# Patient Record
Sex: FEMALE | Race: BLACK OR AFRICAN AMERICAN
[De-identification: names, ages, dates, MRNs, and addresses within clinical notes are randomized per-mention and may not be internally consistent; named-entity substitution may affect disease eponyms.]

---

## 2015-07-25 VITALS
DIASTOLIC BLOOD PRESSURE: 85 MMHG | DIASTOLIC BLOOD PRESSURE: 85 MMHG | SYSTOLIC BLOOD PRESSURE: 159 MMHG | SYSTOLIC BLOOD PRESSURE: 159 MMHG | DIASTOLIC BLOOD PRESSURE: 85 MMHG | DIASTOLIC BLOOD PRESSURE: 85 MMHG | SYSTOLIC BLOOD PRESSURE: 159 MMHG | SYSTOLIC BLOOD PRESSURE: 159 MMHG

## 2021-02-08 ENCOUNTER — HOSPITAL ENCOUNTER (OUTPATIENT)
Dept: HOSPITAL 63 - LAB | Age: 56
End: 2021-02-08
Attending: FAMILY MEDICINE
Payer: COMMERCIAL

## 2021-02-08 DIAGNOSIS — R35.1: ICD-10-CM

## 2021-02-08 DIAGNOSIS — R19.01: ICD-10-CM

## 2021-02-08 DIAGNOSIS — E03.9: Primary | ICD-10-CM

## 2021-02-08 DIAGNOSIS — R09.02: ICD-10-CM

## 2021-02-08 LAB
ALBUMIN SERPL-MCNC: 3.6 G/DL (ref 3.4–5)
ALBUMIN/GLOB SERPL: 0.9 {RATIO} (ref 1–1.7)
ALP SERPL-CCNC: 124 U/L (ref 46–116)
ALT SERPL-CCNC: 25 U/L (ref 14–59)
ANION GAP SERPL CALC-SCNC: 6 MMOL/L (ref 6–14)
APTT PPP: YELLOW S
AST SERPL-CCNC: 18 U/L (ref 15–37)
BACTERIA #/AREA URNS HPF: (no result) /HPF
BASOPHILS # BLD AUTO: 0 X10^3/UL (ref 0–0.2)
BASOPHILS NFR BLD: 0 % (ref 0–3)
BILIRUB SERPL-MCNC: 0.2 MG/DL (ref 0.2–1)
BILIRUB UR QL STRIP: (no result)
BUN/CREAT SERPL: 16 (ref 6–20)
CA-I SERPL ISE-MCNC: 18 MG/DL (ref 7–20)
CALCIUM SERPL-MCNC: 9.2 MG/DL (ref 8.5–10.1)
CHLORIDE SERPL-SCNC: 104 MMOL/L (ref 98–107)
CO2 SERPL-SCNC: 32 MMOL/L (ref 21–32)
CREAT SERPL-MCNC: 1.1 MG/DL (ref 0.6–1)
EOSINOPHIL NFR BLD: 0.3 X10^3/UL (ref 0–0.7)
EOSINOPHIL NFR BLD: 3 % (ref 0–3)
ERYTHROCYTE [DISTWIDTH] IN BLOOD BY AUTOMATED COUNT: 15.9 % (ref 11.5–14.5)
FIBRINOGEN PPP-MCNC: (no result) MG/DL
GFR SERPLBLD BASED ON 1.73 SQ M-ARVRAT: 62.4 ML/MIN
GLOBULIN SER-MCNC: 4 G/DL (ref 2.2–3.8)
GLUCOSE SERPL-MCNC: 98 MG/DL (ref 70–99)
GLUCOSE UR STRIP-MCNC: (no result) MG/DL
HCT VFR BLD CALC: 36.7 % (ref 36–47)
HGB BLD-MCNC: 12 G/DL (ref 12–15.5)
LIPASE: 58 U/L (ref 73–393)
LYMPHOCYTES # BLD: 2.2 X10^3/UL (ref 1–4.8)
LYMPHOCYTES NFR BLD AUTO: 27 % (ref 24–48)
MCH RBC QN AUTO: 29 PG (ref 25–35)
MCHC RBC AUTO-ENTMCNC: 33 G/DL (ref 31–37)
MCV RBC AUTO: 89 FL (ref 79–100)
MONO #: 0.7 X10^3/UL (ref 0–1.1)
MONOCYTES NFR BLD: 8 % (ref 0–9)
NEUT #: 5.2 X10^3UL (ref 1.8–7.7)
NEUTROPHILS NFR BLD AUTO: 62 % (ref 31–73)
NITRITE UR QL STRIP: (no result)
PLATELET # BLD AUTO: 253 X10^3/UL (ref 140–400)
POTASSIUM SERPL-SCNC: 4 MMOL/L (ref 3.5–5.1)
PROT SERPL-MCNC: 7.6 G/DL (ref 6.4–8.2)
RBC # BLD AUTO: 4.12 X10^6/UL (ref 3.5–5.4)
RBC #/AREA URNS HPF: (no result) /HPF (ref 0–2)
SODIUM SERPL-SCNC: 142 MMOL/L (ref 136–145)
SP GR UR STRIP: 1.02
SQUAMOUS #/AREA URNS LPF: (no result) /LPF
UROBILINOGEN UR-MCNC: 0.2 MG/DL
WBC # BLD AUTO: 8.4 X10^3/UL (ref 4–11)
WBC #/AREA URNS HPF: (no result) /HPF (ref 0–4)

## 2021-02-08 PROCEDURE — 85025 COMPLETE CBC W/AUTO DIFF WBC: CPT

## 2021-02-08 PROCEDURE — 81001 URINALYSIS AUTO W/SCOPE: CPT

## 2021-02-08 PROCEDURE — 84439 ASSAY OF FREE THYROXINE: CPT

## 2021-02-08 PROCEDURE — 80053 COMPREHEN METABOLIC PANEL: CPT

## 2021-02-08 PROCEDURE — 84443 ASSAY THYROID STIM HORMONE: CPT

## 2021-02-08 PROCEDURE — 83690 ASSAY OF LIPASE: CPT

## 2021-02-08 PROCEDURE — 85379 FIBRIN DEGRADATION QUANT: CPT

## 2021-02-08 PROCEDURE — 36415 COLL VENOUS BLD VENIPUNCTURE: CPT

## 2021-02-08 PROCEDURE — 87086 URINE CULTURE/COLONY COUNT: CPT

## 2021-02-09 LAB
T4 FREE SERPL-MCNC: 0.92 NG/DL (ref 0.76–1.46)
THYROID STIM HORMONE (TSH): 0.79 UIU/ML (ref 0.36–3.74)

## 2021-02-24 ENCOUNTER — HOSPITAL ENCOUNTER (OUTPATIENT)
Dept: HOSPITAL 63 - CT | Age: 56
End: 2021-02-24
Attending: FAMILY MEDICINE
Payer: COMMERCIAL

## 2021-02-24 DIAGNOSIS — Z98.890: ICD-10-CM

## 2021-02-24 DIAGNOSIS — R19.01: ICD-10-CM

## 2021-02-24 DIAGNOSIS — K57.30: Primary | ICD-10-CM

## 2021-02-24 DIAGNOSIS — M43.8X5: ICD-10-CM

## 2021-02-24 DIAGNOSIS — M47.815: ICD-10-CM

## 2021-02-24 DIAGNOSIS — I70.0: ICD-10-CM

## 2021-02-24 PROCEDURE — 74178 CT ABD&PLV WO CNTR FLWD CNTR: CPT

## 2021-02-24 NOTE — RAD
CT scan of the abdomen and pelvis without and with contrast 2/24/2021



CLINICAL HISTORY: Right upper quadrant abdominal pain and swelling.



TECHNIQUE: After the oral administration contrast only, contiguous, 3 mm axial sections were obtained
 through the abdomen and pelvis. After the intravenous administration of 75 cc of Omnipaque 300, cont
iguous, 5 mm axial sections were obtained through the abdomen and pelvis.



One or more of the following individualized dose reduction techniques were utilized for this study:



1. Automated exposure control.

2. Adjustment of the mA and/or kV according to patient size.

3. Use of iterative reconstruction technique.





Findings: Comparison study is dated 5/4/2012.



Images through the lung bases demonstrate minimal dependent subsegmental atelectasis bilaterally.



On the unenhanced CT images through the abdomen and pelvis, no renal or ureteral calculus is seen.



On the postcontrast images, the liver, spleen, pancreas, and kidneys are within normal limits. Fullne
ss of both adrenal glands is seen, unchanged.



Atherosclerotic calcification of the abdominal aorta and its branches is noted. The abdominal aorta t
apers normally. Surgical clips are seen within the gallbladder fossa consistent with cholecystectomy.
 No free fluid or free air is seen within the abdomen. Air and stool are seen throughout the colon. T
he appendix is well-visualized and is within normal limits. There is no evidence of bowel obstruction
.



Images through the pelvis demonstrate the urinary bladder to be contracted. A few scattered diverticu
la are seen involving the sigmoid colon. No inflammatory changes are seen adjacent fat. No free fluid
 is seen. Calculations are seen within the pelvis consistent with phleboliths. Very mild S-shaped cur
vature of the thoracolumbar spine is seen. Degenerative changes are seen involving lower thoracic and
 throughout the lumbar spine along with both hips.



IMPRESSION: No acute abnormality is seen.



Electronically signed by: Wang Cary MD (2/24/2021 4:26 PM) [id number]

## 2021-03-31 ENCOUNTER — HOSPITAL ENCOUNTER (OUTPATIENT)
Dept: HOSPITAL 63 - NM | Age: 56
End: 2021-03-31
Attending: INTERNAL MEDICINE
Payer: COMMERCIAL

## 2021-03-31 ENCOUNTER — HOSPITAL ENCOUNTER (OUTPATIENT)
Dept: HOSPITAL 63 - RAD | Age: 56
End: 2021-03-31
Attending: NURSE PRACTITIONER
Payer: COMMERCIAL
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
## 2021-03-31 DIAGNOSIS — S05.11XD: Primary | ICD-10-CM

## 2021-03-31 DIAGNOSIS — K31.84: Primary | ICD-10-CM

## 2021-03-31 PROCEDURE — A9541 TC99M SULFUR COLLOID: HCPCS

## 2021-03-31 PROCEDURE — 78264 GASTRIC EMPTYING IMG STUDY: CPT

## 2021-03-31 PROCEDURE — 70200 X-RAY EXAM OF EYE SOCKETS: CPT

## 2021-03-31 NOTE — RAD
EXAM: NUCLEAR GASTRIC EMPTYING SCAN.



HISTORY: Gastroparesis.



COMPARISON: None.



TECHNIQUE: Serial static images were obtained over the stomach following oral administration of 2 mCi
 of 99m-Tc sulfur colloid in a solid meal.



FINDINGS: The stomach appears normal in contour. There is clearance of activity into the small bowel.
 The remaining fraction of gastric activity is as follows.



1 hour 71% (normal range 34.8-91%)

2 hour 47% (normal range 2.7-60%)

3 hour 3% (normal range 0.5-28%)

4 hour 1% (normal range 0-10%) 



IMPRESSION: 

1. Normal gastric emptying.



Electronically signed by: AVA Summers MD (3/31/2021 3:17 PM) [facility]
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
## 2021-08-20 ENCOUNTER — HOSPITAL ENCOUNTER (EMERGENCY)
Dept: HOSPITAL 63 - ER | Age: 56
Discharge: HOME | End: 2021-08-20
Payer: COMMERCIAL

## 2021-08-20 VITALS — HEIGHT: 63 IN | WEIGHT: 253.53 LBS | BODY MASS INDEX: 44.92 KG/M2

## 2021-08-20 VITALS — SYSTOLIC BLOOD PRESSURE: 128 MMHG | DIASTOLIC BLOOD PRESSURE: 87 MMHG

## 2021-08-20 DIAGNOSIS — R10.11: Primary | ICD-10-CM

## 2021-08-20 LAB
ALBUMIN SERPL-MCNC: 3.6 G/DL (ref 3.4–5)
ALBUMIN/GLOB SERPL: 0.9 {RATIO} (ref 1–1.7)
ALP SERPL-CCNC: 125 U/L (ref 46–116)
ALT SERPL-CCNC: 28 U/L (ref 14–59)
ANION GAP SERPL CALC-SCNC: 7 MMOL/L (ref 6–14)
APTT PPP: YELLOW S
AST SERPL-CCNC: 20 U/L (ref 15–37)
BACTERIA #/AREA URNS HPF: (no result) /HPF
BASOPHILS # BLD AUTO: 0 X10^3/UL (ref 0–0.2)
BASOPHILS NFR BLD: 1 % (ref 0–3)
BILIRUB SERPL-MCNC: 0.3 MG/DL (ref 0.2–1)
BILIRUB UR QL STRIP: (no result)
BUN/CREAT SERPL: 11 (ref 6–20)
CA-I SERPL ISE-MCNC: 11 MG/DL (ref 7–20)
CALCIUM SERPL-MCNC: 8.9 MG/DL (ref 8.5–10.1)
CHLORIDE SERPL-SCNC: 102 MMOL/L (ref 98–107)
CO2 SERPL-SCNC: 32 MMOL/L (ref 21–32)
CREAT SERPL-MCNC: 1 MG/DL (ref 0.6–1)
EOSINOPHIL NFR BLD: 0.3 X10^3/UL (ref 0–0.7)
EOSINOPHIL NFR BLD: 5 % (ref 0–3)
ERYTHROCYTE [DISTWIDTH] IN BLOOD BY AUTOMATED COUNT: 15.1 % (ref 11.5–14.5)
FIBRINOGEN PPP-MCNC: CLEAR MG/DL
GFR SERPLBLD BASED ON 1.73 SQ M-ARVRAT: 69.7 ML/MIN
GLOBULIN SER-MCNC: 3.8 G/DL (ref 2.2–3.8)
GLUCOSE SERPL-MCNC: 114 MG/DL (ref 70–99)
GLUCOSE UR STRIP-MCNC: (no result) MG/DL
HCT VFR BLD CALC: 38.7 % (ref 36–47)
HGB BLD-MCNC: 12.9 G/DL (ref 12–15.5)
LYMPHOCYTES # BLD: 1.7 X10^3/UL (ref 1–4.8)
LYMPHOCYTES NFR BLD AUTO: 24 % (ref 24–48)
MCH RBC QN AUTO: 30 PG (ref 25–35)
MCHC RBC AUTO-ENTMCNC: 33 G/DL (ref 31–37)
MCV RBC AUTO: 90 FL (ref 79–100)
MONO #: 0.4 X10^3/UL (ref 0–1.1)
MONOCYTES NFR BLD: 6 % (ref 0–9)
NEUT #: 4.4 X10^3UL (ref 1.8–7.7)
NEUTROPHILS NFR BLD AUTO: 64 % (ref 31–73)
NITRITE UR QL STRIP: (no result)
PLATELET # BLD AUTO: 238 X10^3/UL (ref 140–400)
POTASSIUM SERPL-SCNC: 3.6 MMOL/L (ref 3.5–5.1)
PROT SERPL-MCNC: 7.4 G/DL (ref 6.4–8.2)
RBC # BLD AUTO: 4.29 X10^6/UL (ref 3.5–5.4)
RBC #/AREA URNS HPF: 0 /HPF (ref 0–2)
SODIUM SERPL-SCNC: 141 MMOL/L (ref 136–145)
SP GR UR STRIP: 1.01
SQUAMOUS #/AREA URNS LPF: (no result) /LPF
UROBILINOGEN UR-MCNC: 0.2 MG/DL
WBC # BLD AUTO: 6.9 X10^3/UL (ref 4–11)
WBC #/AREA URNS HPF: (no result) /HPF (ref 0–4)

## 2021-08-20 PROCEDURE — 81001 URINALYSIS AUTO W/SCOPE: CPT

## 2021-08-20 PROCEDURE — 36415 COLL VENOUS BLD VENIPUNCTURE: CPT

## 2021-08-20 PROCEDURE — 99285 EMERGENCY DEPT VISIT HI MDM: CPT

## 2021-08-20 PROCEDURE — 74177 CT ABD & PELVIS W/CONTRAST: CPT

## 2021-08-20 PROCEDURE — 85025 COMPLETE CBC W/AUTO DIFF WBC: CPT

## 2021-08-20 PROCEDURE — 80053 COMPREHEN METABOLIC PANEL: CPT

## 2021-08-20 NOTE — RAD
CT ABDOMEN+PELVIS W



History: Right upper quadrant pain



Comparison: CT abdomen and pelvis 2/24/2021.



Technique: CT of the abdomen and pelvis with intravenous contrast. 



Findings:

Trace right pleural effusion. No significant airspace consolidation.



Redemonstrated approximately 5 mm right hepatic cyst. The liver is otherwise unremarkable. Status pos
t cholecystectomy with mild intrahepatic biliary ductal dilatation, similar to comparison. The common
 bile duct measures approximately 7 mm diameter. The pancreas, spleen, and kidneys are normal. There 
is bilateral adrenal thickening similar to comparison. Normal bladder. Postsurgical changes from hyst
erectomy. No pelvic masses.



No focal gastric abnormality. Small bowel is within normal limits. The appendix is visualized and nor
mal. Minimal sigmoid diverticulosis. No evidence of diverticulitis. No free intra-abdominal air or fl
uid. Minimal aortoiliac atherosclerotic calcification. No aneurysm. Small fat-containing umbilical he
rnia. Mild degenerative changes at the lower lumbar facets. Mild disc space narrowing L3-L4.



Impression: 



1.  No acute findings in the abdomen and pelvis.

2.  Trace right pleural effusion of uncertain etiology/significance.





------

Exposure: One or more of the following individualized dose reduction techniques were utilized for thi
s examination:  

1. Automated exposure control

2. Adjustment of the mA and/or kV according to patient size

3. Use of iterative reconstruction technique.



Electronically signed by: Devendra Meeks MD (8/20/2021 9:14 AM) [id number]

## 2021-08-20 NOTE — PHYS DOC
Past History


Past Medical History:  Angina, Asthma, Hypertension, Other


Additional Past Medical Histor:  gastroparesis, narcolepsy, chronic back pain


Past Surgical History:  Cholecystectomy, , Other


Additional Past Surgical Histo:  pt reports "5 lb tumor removed."


Smoking:  Cigarettes


Alcohol Use:  Occasionally


Drug Use:  None





General Adult


EDM:


Chief Complaint:  ABDOMINAL PAIN





HPI:


HPI:


56-year-old female presents with right upper quadrant pain.  The patient has 

been having intermittent pain in this area for several years.  She moves her 

right arm a certain way and she gets a severe cramping pain up under her right 

breast.  The only thing that makes it better is to put pressure on that area.  

She has discussed it with her primary care physician and no one has been able to

come up with a reason for it.  Patient is very concerned about it being a 

cancerous tumor.  She does not have a particular reason for that she is just 

worried about it.  She denies fever or chills.  She does not have any nausea, 

vomiting, diarrhea or constipation.  She presents today because she has been 

having pain frequently for the last 2 days in this area.





Review of Systems:


Review of Systems:


Constitutional:  Denies fever or chills 


Eyes:  Denies change in visual acuity 


HENT:  Denies nasal congestion or sore throat 


Respiratory:  Denies cough or shortness of breath 


Cardiovascular:  Denies chest pain or edema 


GI: Right upper quadrant abdominal pain. Denies nausea, vomiting, bloody stools 

or diarrhea 


: Denies dysuria 


Musculoskeletal:  Denies back pain or joint pain 


Integument:  Denies rash 


Neurologic:  Denies headache, focal weakness or sensory changes 


Endocrine:  Denies polyuria or polydipsia 


Lymphatic:  Denies swollen glands 


Psychiatric:  Denies depression or anxiety





Allergies:


Allergies:





Allergies








Coded Allergies Type Severity Reaction Last Updated Verified


 


  No Known Drug Allergies    21 No











Physical Exam:


PE:





Constitutional: Well developed, well nourished, morbidly obese, no acute 

distress, non-toxic appearance. []


HENT: Normocephalic, atraumatic, bilateral external ears normal, oropharynx 

moist, no oral exudates, nose normal. []


Eyes: PERRLA, EOMI, conjunctiva normal, no discharge. [] 


Neck: Normal range of motion, no tenderness, supple, no stridor. [] 


Cardiovascular: Heart rate regular rhythm, no murmur []


Lungs & Thorax:  Bilateral breath sounds clear to auscultation []


Abdomen: Bowel sounds normal, soft, no tenderness, no masses, no pulsatile 

masses. [] 


Skin: Warm, dry, no erythema, no rash. [] 


Back: No tenderness, no CVA tenderness. [] 


Extremities: No tenderness, no cyanosis, no clubbing, ROM intact, no edema. [] 


Neurologic: Alert and oriented X 3, normal motor function, normal sensory 

function, no focal deficits noted. []


Psychologic: Affect normal, judgement normal, mood anxious []





Current Patient Data:


Vital Signs:





                                   Vital Signs








  Date Time  Temp Pulse Resp B/P (MAP) Pulse Ox O2 Delivery O2 Flow Rate FiO2


 


21 08:00 98.1 97 17 148/95 98 Room Air  











EKG:


EKG:


[]





Radiology/Procedures:


Radiology/Procedures:


[]


Impressions:


CT ABDOMEN+PELVIS W





History: Right upper quadrant pain





Comparison: CT abdomen and pelvis 2021.





Technique: CT of the abdomen and pelvis with intravenous contrast. 





Findings:


Trace right pleural effusion. No significant airspace consolidation.





Redemonstrated approximately 5 mm right hepatic cyst. The liver is otherwise 

unremarkable. Status post cholecystectomy with mild intrahepatic biliary ductal 

dilatation, similar to comparison. The common bile duct measures approximately 7

 mm diameter. The pancreas, spleen, and kidneys are normal. There is bilateral 

adrenal thickening similar to comparison. Normal bladder. Postsurgical changes 

from hysterectomy. No pelvic masses.





No focal gastric abnormality. Small bowel is within normal limits. The appendix 

is visualized and normal. Minimal sigmoid diverticulosis. No evidence of 

diverticulitis. No free intra-abdominal air or fluid. Minimal aortoiliac 

atherosclerotic calcification. No aneurysm. Small fat-containing umbilical 

hernia. Mild degenerative changes at the lower lumbar facets. Mild disc space 

narrowing L3-L4.





Impression: 





1.  No acute findings in the abdomen and pelvis.


2.  Trace right pleural effusion of uncertain etiology/significance.








------


Exposure: One or more of the following individualized dose reduction techniques 

were utilized for this examination:  


1. Automated exposure control


2. Adjustment of the mA and/or kV according to patient size


3. Use of iterative reconstruction technique.





Electronically signed by: Devendra Meeks MD (2021 9:14 AM) [id number]














DICTATED AND SIGNED BY:     DEVENDRA MEEKS MD


DATE:     21





CC: ANA RIVER DO; MADDY PLAZA DO ~MTH0 0





Heart Score:


C/O Chest Pain:  N/A


Risk Factors:


Risk Factors:  DM, Current or recent (<one month) smoker, HTN, HLP, family 

history of CAD, obesity.


Risk Scores:


Score 0 - 3:  2.5% MACE over next 6 weeks - Discharge Home


Score 4 - 6:  20.3% MACE over next 6 weeks - Admit for Clinical Observation


Score 7 - 10:  72.7% MACE over next 6 weeks - Early Invasive Strategies





Course & Med Decision Making:


Course & Med Decision Making


Pertinent Labs and Imaging studies reviewed. (See chart for details)


The patient's labs are unremarkable.  Her CT of the abdomen pelvis does not show

 any acute findings.  See official read for details.  I suspect the patient may 

have been intermittently displacing the rib.  Based on the description of the 

episodes and what causes the pain to happen, this is 1 potential diagnosis.  

Regardless, it does appear to be musculoskeletal and not intraabdominal.  The 

patient is reassured by these results.  She is stable for discharge at this 

time.


[]





Dragon Disclaimer:


Dragon Disclaimer:


This electronic medical record was generated, in whole or in part, using a voice

 recognition dictation system.





Departure


Departure:


Impression:  


   Primary Impression:  


   Right upper quadrant abdominal pain


Disposition:   HOME / SELF CARE / HOMELESS


Condition:  STABLE


Referrals:  


MADDY PLAZA DO (PCP)


Patient Instructions:  Abdominal Pain (Nonspecific)











ANA RIVER DO                 Aug 20, 2021 08:26

## 2022-02-07 ENCOUNTER — HOSPITAL ENCOUNTER (EMERGENCY)
Dept: HOSPITAL 63 - ER | Age: 57
Discharge: HOME | End: 2022-02-07
Payer: COMMERCIAL

## 2022-02-07 VITALS — BODY MASS INDEX: 44.92 KG/M2 | WEIGHT: 253.53 LBS | HEIGHT: 63 IN

## 2022-02-07 VITALS
SYSTOLIC BLOOD PRESSURE: 138 MMHG | DIASTOLIC BLOOD PRESSURE: 81 MMHG | SYSTOLIC BLOOD PRESSURE: 138 MMHG | DIASTOLIC BLOOD PRESSURE: 81 MMHG

## 2022-02-07 DIAGNOSIS — I10: ICD-10-CM

## 2022-02-07 DIAGNOSIS — F17.210: ICD-10-CM

## 2022-02-07 DIAGNOSIS — G89.29: ICD-10-CM

## 2022-02-07 DIAGNOSIS — J45.901: Primary | ICD-10-CM

## 2022-02-07 PROCEDURE — 94640 AIRWAY INHALATION TREATMENT: CPT

## 2022-02-07 PROCEDURE — 99283 EMERGENCY DEPT VISIT LOW MDM: CPT

## 2022-02-07 PROCEDURE — 96372 THER/PROPH/DIAG INJ SC/IM: CPT

## 2022-02-07 PROCEDURE — 71045 X-RAY EXAM CHEST 1 VIEW: CPT

## 2022-02-07 NOTE — RAD
EXAM: Chest, single view.



HISTORY: Shortness of breath. Wheezing.



COMPARISON: None.



FINDINGS: A frontal view of the chest is obtained. There is no infiltrate, pleural effusion or pneumo
thorax. The heart is normal in size for portable technique.



IMPRESSION: No acute pulmonary finding.



Electronically signed by: Snow Smith MD (2/7/2022 12:12 PM) [id number]

## 2022-02-07 NOTE — PHYS DOC
Past History


Past Medical History:  Angina, Asthma, Hypertension, Other


Additional Past Medical Histor:  gastroparesis, narcolepsy, chronic back pain


Past Surgical History:  Cholecystectomy, , Other


Additional Past Surgical Histo:  pt reports "5 lb tumor removed."


Smoking:  Cigarettes


Alcohol Use:  None


Drug Use:  None





General Adult


EDM:


Chief Complaint:  SHORTNESS OF BREATH





HPI:


HPI:





Patient is a 57 year old female with history of asthma who presents with 

increased shortness of breath and wheezing.  Patient was seen in urgent care on 

Saturday.  At that time, she was prescribed antibiotics and prednisone.  She 

states she did not feel well enough to go  her prescriptions.  Patient 

presents today with continued shortness of breath and wheezing.  She denies all 

other complaints.





Review of Systems:


Review of Systems:


Constitutional:  Denies fever, chills or generalized weakness


Eyes:  Denies change in visual acuity, visual field deficits or discharge


HENT:  Denies ear pain, nasal congestion or sore throat 


Respiratory:  See HPI


Cardiovascular:  Denies chest pain, palpitations or edema 


GI:  Denies abdominal pain, nausea, vomiting, bloody stools or diarrhea 


: Denies dysuria or hematuria


Musculoskeletal:  Denies back pain or joint pain 


Integument:  Denies rash or other skin lesion


Neurologic:  Denies headache, focal weakness or sensory changes





Current Medications:


Current Meds:





Current Medications








 Medications


  (Trade)  Dose


 Ordered  Sig/[place]  Start Time


 Stop Time Status Last Admin


Dose Admin


 


 Albuterol/


 Ipratropium


  (Duoneb)  3 ml  1X  ONCE  22 12:00


 22 12:06 DC 22 11:59


3 ML


 


 Dexamethasone


 Sodium Phosphate


  (Decadron)  12 mg  1X  ONCE  22 12:00


 22 12:06 DC 22 12:08


12 MG











Allergies:


Allergies:





Allergies








Coded Allergies Type Severity Reaction Last Updated Verified


 


  No Known Drug Allergies    21 No











Physical Exam:


PE:





Constitutional: Obese, no acute distress, non-toxic appearance. 


HENT: Normocephalic, atraumatic, bilateral external ears normal, nose normal. 


Eyes: EOMI, conjunctiva normal, no discharge.  


Neck: Normal range of motion, no stridor.  


Cardiovascular: Heart rate regular rhythm, no obvious murmur.


Lungs & Thorax: Bilateral breath sounds expiratory wheezing without rales or 

rhonchi.


Skin: Warm, dry, no erythema, no rash, no cyanosis. 


Neurologic: Alert and oriented x4, no focal deficits noted.





Current Patient Data:


Vital Signs:





                                   Vital Signs








  Date Time  Temp Pulse Resp B/P (MAP) Pulse Ox O2 Delivery O2 Flow Rate FiO2


 


22 12:01     98 Room Air  


 


22 11:32 98.1 69 16 138/81 (100)    











Radiology/Procedures:


Radiology/Procedures:


PROCEDURE: CHEST AP ONLY





EXAM: Chest, single view.





HISTORY: Shortness of breath. Wheezing.





COMPARISON: None.





FINDINGS: A frontal view of the chest is obtained. There is no infiltrate, 

pleural effusion or pneumothorax. The heart is normal in size for portable 

technique.





IMPRESSION: No acute pulmonary finding.





Electronically signed by: Snow Smith MD (2022 12:12 PM) [id number]














DICTATED AND SIGNED BY:     SNOW SMITH MD





Heart Score:


C/O Chest Pain:  No





Course & Med Decision Making:


Course & Med Decision Making


Pertinent Labs and Imaging studies reviewed. (See chart for details)





Patient is a 57-year-old female who with presentation consistent with asthma 

exacerbation.  She was treated on Saturday, but did not  medications.  

She will be treated today with IM dexamethasone and DuoNeb.  Patient strongly 

advised to quit smoking and  her prescriptions when she leaves the 

department today.





After receiving breathing treatment, patient breath sounds improved.  Negative 

chest x-ray findings discussed.  Patient was given return precautions.  She 

understands and is agreeable to discharge plan.





Dragon Disclaimer:


Dragon Disclaimer:


This electronic medical record was generated, in whole or in part, using a voice

recognition dictation system.





Departure


Departure:


Impression:  


   Primary Impression:  


   Asthma exacerbation, mild


Disposition:   HOME / SELF CARE / HOMELESS


Condition:  IMPROVED


Referrals:  


MADDY PLAZA DO (PCP)


Patient Instructions:  Asthma Attacks, Prevention, Asthma, Adult, Easy-to-Read





Additional Instructions:  


EMERGENCY DEPARTMENT GENERAL DISCHARGE INSTRUCTIONS





Thank you for coming to [facility] (ED) today and trusting us

with you care.  We trust that you had a positive experience in our Emergency 

Department.  If you wish to speak to the department management, you may call the

director at [phone].





YOUR FOLLOW UP INSTRUCTIONS ARE AS FOLLOWS:


1.  Follow up with your primary care doctor. If you do not have a primary 

doctor, please ask for a resource list of physicians or clinics that may be able

to assist you with follow up care.


2.  The emergency provider has interpreted your imaging studies, if any were 

ordered.  The radiology imaging specialist also reviewed them.  If there is a 

change in the findings, you will be notified in 48 hours when at all possible.


3.  If a lab test or culture has been done, your results will be reviewed and 

you will be notified if you need a change in treatment.


4.  Follow instructions verbalized to you and refer to the printouts if needed.





ADDITIONAL INSTRUCTIONS AND INFORMATION:


1.  Your care today has been supervised by a physician who is specially trained 

in emergency care.  Many problems require more than one evaluation for a 

complete diagnosis and treatment.  We recommend that you schedule your follow up

appointment as recommended to ensure complete treatment of you illness or 

injury.  If you are unable to obtain follow up care and continue to have a 

problem, or if your condition worsens, we recommend that you return to the ED.


2.  We are not able to safely determine your condition over the phone nor are we

able to give sound medical advice over the phone.  For these safety reasons, if 

you call for medical advice we will ask you to come to the ED for further 

evaluation.


3.  If you have any questions regarding these discharge instructions please call

the ED at [phone].





SAFETY INFORMATION:


In the interest of safety, wellness, and injury prevention; we encourage you to 

wear your seat belt, if you smoke; quite smoking, and we encourage family to use

a protective helmet for bicycling and other sporting events that present an 

increased risk for head injury.





IF YOUR SYMPTOMS WORSEN OR NEW SYMPTOMS DEVELOP, OR YOU HAVE CONCERNS ABOUT YOUR

CONDITION; OR IF YOUR CONDITION WORSENS WHILE YOU ARE WAITING FOR YOUR FOLLOW UP

APPOINTMENT; EITHER CONTACT YOUR PRIMARY CARE DOCTOR, THE PHYSICIAN WHOSE NAME 

AND NUMBER YOU WERE GIVEN, OR RETURN TO THE ED IMMEDIATELY.











NORM SAVAGE               2022 12:25

## 2022-05-19 ENCOUNTER — HOSPITAL ENCOUNTER (OUTPATIENT)
Dept: HOSPITAL 63 - RAD | Age: 57
End: 2022-05-19
Payer: COMMERCIAL

## 2022-05-19 DIAGNOSIS — M51.36: Primary | ICD-10-CM

## 2022-05-19 DIAGNOSIS — M43.07: ICD-10-CM

## 2022-05-19 DIAGNOSIS — M48.07: ICD-10-CM

## 2022-05-19 PROCEDURE — 72100 X-RAY EXAM L-S SPINE 2/3 VWS: CPT
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
## 2022-05-25 ENCOUNTER — HOSPITAL ENCOUNTER (EMERGENCY)
Dept: HOSPITAL 63 - ER | Age: 57
Discharge: HOME | End: 2022-05-25
Payer: COMMERCIAL

## 2022-05-25 VITALS — HEIGHT: 63 IN | WEIGHT: 253.53 LBS | BODY MASS INDEX: 44.92 KG/M2

## 2022-05-25 VITALS — SYSTOLIC BLOOD PRESSURE: 152 MMHG | DIASTOLIC BLOOD PRESSURE: 76 MMHG

## 2022-05-25 DIAGNOSIS — Z90.49: ICD-10-CM

## 2022-05-25 DIAGNOSIS — G89.29: ICD-10-CM

## 2022-05-25 DIAGNOSIS — Z98.890: ICD-10-CM

## 2022-05-25 DIAGNOSIS — F17.210: ICD-10-CM

## 2022-05-25 DIAGNOSIS — I10: ICD-10-CM

## 2022-05-25 DIAGNOSIS — J45.901: Primary | ICD-10-CM

## 2022-05-25 LAB
ALBUMIN SERPL-MCNC: 3.5 G/DL (ref 3.4–5)
ALBUMIN/GLOB SERPL: 1.1 {RATIO} (ref 1–1.7)
ALP SERPL-CCNC: 122 U/L (ref 46–116)
ALT SERPL-CCNC: 25 U/L (ref 14–59)
ANION GAP SERPL CALC-SCNC: 9 MMOL/L (ref 6–14)
APTT PPP: YELLOW S
AST SERPL-CCNC: 15 U/L (ref 15–37)
BACTERIA #/AREA URNS HPF: (no result) /HPF
BASOPHILS # BLD AUTO: 0.1 X10^3/UL (ref 0–0.2)
BASOPHILS NFR BLD: 1 % (ref 0–3)
BILIRUB SERPL-MCNC: 0.1 MG/DL (ref 0.2–1)
BUN/CREAT SERPL: 21 (ref 6–20)
CA-I SERPL ISE-MCNC: 21 MG/DL (ref 7–20)
CALCIUM SERPL-MCNC: 9.5 MG/DL (ref 8.5–10.1)
CHLORIDE SERPL-SCNC: 105 MMOL/L (ref 98–107)
CO2 SERPL-SCNC: 30 MMOL/L (ref 21–32)
CREAT SERPL-MCNC: 1 MG/DL (ref 0.6–1)
EOSINOPHIL NFR BLD: 0.2 X10^3/UL (ref 0–0.7)
EOSINOPHIL NFR BLD: 2 % (ref 0–3)
ERYTHROCYTE [DISTWIDTH] IN BLOOD BY AUTOMATED COUNT: 14.9 % (ref 11.5–14.5)
FIBRINOGEN PPP-MCNC: (no result) MG/DL
GFR SERPLBLD BASED ON 1.73 SQ M-ARVRAT: 69.4 ML/MIN
GLOBULIN SER-MCNC: 3.2 G/DL (ref 2.2–3.8)
GLUCOSE SERPL-MCNC: 101 MG/DL (ref 70–99)
GLUCOSE UR STRIP-MCNC: (no result) MG/DL
HCT VFR BLD CALC: 36.1 % (ref 36–47)
HGB BLD-MCNC: 12.1 G/DL (ref 12–15.5)
LYMPHOCYTES # BLD: 3.1 X10^3/UL (ref 1–4.8)
LYMPHOCYTES NFR BLD AUTO: 26 % (ref 24–48)
MCH RBC QN AUTO: 30 PG (ref 25–35)
MCHC RBC AUTO-ENTMCNC: 34 G/DL (ref 31–37)
MCV RBC AUTO: 90 FL (ref 79–100)
MONO #: 0.7 X10^3/UL (ref 0–1.1)
MONOCYTES NFR BLD: 6 % (ref 0–9)
NEUT #: 7.8 X10^3UL (ref 1.8–7.7)
NEUTROPHILS NFR BLD AUTO: 65 % (ref 31–73)
NITRITE UR QL STRIP: (no result)
PLATELET # BLD AUTO: 239 X10^3/UL (ref 140–400)
POTASSIUM SERPL-SCNC: 3.8 MMOL/L (ref 3.5–5.1)
PROT SERPL-MCNC: 6.7 G/DL (ref 6.4–8.2)
RBC # BLD AUTO: 4.01 X10^6/UL (ref 3.5–5.4)
RBC #/AREA URNS HPF: (no result) /HPF (ref 0–2)
SODIUM SERPL-SCNC: 144 MMOL/L (ref 136–145)
SP GR UR STRIP: 1.02
SQUAMOUS #/AREA URNS LPF: (no result) /LPF
UROBILINOGEN UR-MCNC: 0.2 MG/DL
WBC # BLD AUTO: 12 X10^3/UL (ref 4–11)
WBC #/AREA URNS HPF: (no result) /HPF (ref 0–4)

## 2022-05-25 PROCEDURE — 84484 ASSAY OF TROPONIN QUANT: CPT

## 2022-05-25 PROCEDURE — 81001 URINALYSIS AUTO W/SCOPE: CPT

## 2022-05-25 PROCEDURE — 93005 ELECTROCARDIOGRAM TRACING: CPT

## 2022-05-25 PROCEDURE — 87086 URINE CULTURE/COLONY COUNT: CPT

## 2022-05-25 PROCEDURE — 99285 EMERGENCY DEPT VISIT HI MDM: CPT

## 2022-05-25 PROCEDURE — 94640 AIRWAY INHALATION TREATMENT: CPT

## 2022-05-25 PROCEDURE — 80053 COMPREHEN METABOLIC PANEL: CPT

## 2022-05-25 PROCEDURE — 96374 THER/PROPH/DIAG INJ IV PUSH: CPT

## 2022-05-25 PROCEDURE — 85025 COMPLETE CBC W/AUTO DIFF WBC: CPT

## 2022-05-25 PROCEDURE — 71045 X-RAY EXAM CHEST 1 VIEW: CPT

## 2022-05-25 PROCEDURE — 36415 COLL VENOUS BLD VENIPUNCTURE: CPT

## 2022-05-25 NOTE — RAD
XR CHEST 1V



History: Reason: SOB, ASTHMA / Spl. Instructions:  / History: 



Comparison: February 7, 2022



Findings:

No consolidation or pleural effusion. Normal heart size. No pneumothorax.



Impression: 

1.  No acute cardiopulmonary process.



Electronically signed by: Kiran Hayward DO (5/25/2022 9:59 AM) [id number]

## 2022-05-25 NOTE — PHYS DOC
Past History


Past Medical History:  Angina, Asthma, Hypertension, Other


Additional Past Medical Histor:  gastroparesis, narcolepsy, chronic back pain


Past Surgical History:  Cholecystectomy, , Other


Additional Past Surgical Histo:  pt reports "5 lb tumor removed."


Smoking:  Cigarettes


Alcohol Use:  None


Drug Use:  None





General Adult


EDM:


Chief Complaint:  SHORTNESS OF BREATH





HPI:


HPI:


57-year-old female presents with shortness of breath.  Patient has a history of 

asthma.  She tells me that for the last 2 to 3 weeks she has had more difficulty

with her breathing.  She has been on cefdinir previously, prednisone and oral 

treatment that she does finish yesterday, Symbicort daily, albuterol nebulizer 3

times a day.  She states that she still just feels very short of breath.  It is 

worse with activity.  She denies any increased swelling, weight gain, chest 

pain.  She admits that over the last 5 years she has had episodes of asthma like

this about once a year.  She has no cardiac history.  Denies fever or chills.





Review of Systems:


Review of Systems:


Constitutional:  Denies fever or chills 


Eyes:  Denies change in visual acuity 


HENT:  Denies nasal congestion or sore throat 


Respiratory: Cough with shortness of breath 


Cardiovascular:  Denies chest pain or edema 


GI:  Denies abdominal pain, nausea, vomiting, bloody stools or diarrhea 


: Denies dysuria 


Musculoskeletal:  Denies back pain or joint pain 


Integument:  Denies rash 


Neurologic:  Denies headache, focal weakness or sensory changes 


Endocrine:  Denies polyuria or polydipsia 


Lymphatic:  Denies swollen glands 


Psychiatric:  Denies depression or anxiety





Current Medications:


Current Meds:





Current Medications








 Medications


  (Trade)  Dose


 Ordered  Sig/[place]  Start Time


 Stop Time Status Last Admin


Dose Admin


 


 Albuterol/


 Ipratropium


  (Duoneb)  3 ml  STK-MED ONCE  22 08:56


 22 08:56 DC  














Allergies:


Allergies:





Allergies








Coded Allergies Type Severity Reaction Last Updated Verified


 


  No Known Drug Allergies    21 No











Physical Exam:


PE:





Constitutional: Well developed, well nourished, morbidly obese, no acute 

distress, non-toxic appearance. []


HENT: Normocephalic, atraumatic, bilateral external ears normal, oropharynx 

moist, no oral exudates, nose normal. []


Eyes: PERRLA, EOMI, conjunctiva normal, no discharge. [] 


Neck: Normal range of motion, no tenderness, supple, no stridor. [] 


Cardiovascular: Heart rate regular rhythm, no murmur []


Lungs & Thorax:  Mild end expiratory wheezing anterior upper bilaterally []


Abdomen: Bowel sounds normal, soft, no tenderness, no masses, no pulsatile 

masses. [] 


Skin: Warm, dry, no erythema, no rash. [] 


Back: No tenderness, no CVA tenderness. [] 


Extremities: No tenderness, no cyanosis, no clubbing, ROM intact, no edema. [] 


Neurologic: Alert and oriented X 3, normal motor function, normal sensory 

function, no focal deficits noted. []


Psychologic: Affect normal, judgement normal, mood normal. []





Current Patient Data:


Vital Signs:





                                   Vital Signs








  Date Time  Temp Pulse Resp B/P (MAP) Pulse Ox O2 Delivery O2 Flow Rate FiO2


 


22 08:39 97.9 82 16 138/81 (100) 93 Room Air  











EKG:


EKG:


[]





Radiology/Procedures:


Radiology/Procedures:


[]





Heart Score:


C/O Chest Pain:  N/A


Risk Factors:


Risk Factors:  DM, Current or recent (<one month) smoker, HTN, HLP, family 

history of CAD, obesity.


Risk Scores:


Score 0 - 3:  2.5% MACE over next 6 weeks - Discharge Home


Score 4 - 6:  20.3% MACE over next 6 weeks - Admit for Clinical Observation


Score 7 - 10:  72.7% MACE over next 6 weeks - Early Invasive Strategies





Course & Med Decision Making:


Course & Med Decision Making


Pertinent Labs and Imaging studies reviewed. (See chart for details)


The patient's EKG is unremarkable.  Her labs are unremarkable.  Her troponin is 

negative.  Her chest x-ray is negative for acute findings.  I gave the patient a

 DuoNeb treatment as well as 125 Solu-Medrol.  She states feeling much better at

 this time.  I will discharge her with a prescription for DuoNeb treatments as 

this may help in addition to her Symbicort and albuterol.  Urinalysis is 

negative for infection.  She is stable for discharge at this time.


[]





Dragon Disclaimer:


Dragon Disclaimer:


This electronic medical record was generated, in whole or in part, using a voice

 recognition dictation system.





Departure


Departure:


Impression:  


   Primary Impression:  


   Asthma exacerbation


Disposition:   HOME / SELF CARE / HOMELESS


Condition:  IMPROVED


Referrals:  


MADDY PLAZA DO (PCP)


Patient Instructions:  Asthma, Adult, Easy-to-Read


Scripts


Ipratropium/Albuterol Sulfate (DUONEB 0.5-3(2.5) MG/3 ML) 3 Ml Ampul.neb


3 ML NEB TID PRN for SHORTNESS OF BREATH, #1 BOX


   Prov: ANA RIVER DO         22











ANA RIVER DO                 May 25, 2022 09:19